# Patient Record
Sex: MALE | Race: OTHER | Employment: FULL TIME | URBAN - METROPOLITAN AREA
[De-identification: names, ages, dates, MRNs, and addresses within clinical notes are randomized per-mention and may not be internally consistent; named-entity substitution may affect disease eponyms.]

---

## 2021-02-10 ENCOUNTER — OFFICE VISIT (OUTPATIENT)
Dept: FAMILY MEDICINE CLINIC | Age: 26
End: 2021-02-10

## 2021-02-10 VITALS
BODY MASS INDEX: 27.55 KG/M2 | WEIGHT: 196.8 LBS | SYSTOLIC BLOOD PRESSURE: 124 MMHG | TEMPERATURE: 97.2 F | HEIGHT: 71 IN | HEART RATE: 80 BPM | DIASTOLIC BLOOD PRESSURE: 80 MMHG | OXYGEN SATURATION: 96 %

## 2021-02-10 DIAGNOSIS — R35.0 URINATION FREQUENCY: Primary | ICD-10-CM

## 2021-02-10 PROCEDURE — 99202 OFFICE O/P NEW SF 15 MIN: CPT | Performed by: INTERNAL MEDICINE

## 2021-02-10 RX ORDER — SULFAMETHOXAZOLE AND TRIMETHOPRIM 800; 160 MG/1; MG/1
1 TABLET ORAL 2 TIMES DAILY
Qty: 20 TABLET | Refills: 0 | Status: SHIPPED | OUTPATIENT
Start: 2021-02-10 | End: 2021-02-20

## 2021-02-10 RX ORDER — SULFAMETHOXAZOLE AND TRIMETHOPRIM 800; 160 MG/1; MG/1
1 TABLET ORAL 2 TIMES DAILY
Qty: 20 TABLET | Refills: 0 | Status: SHIPPED | OUTPATIENT
Start: 2021-02-10 | End: 2021-02-10 | Stop reason: SDUPTHER

## 2021-02-10 SDOH — ECONOMIC STABILITY: INCOME INSECURITY: HOW HARD IS IT FOR YOU TO PAY FOR THE VERY BASICS LIKE FOOD, HOUSING, MEDICAL CARE, AND HEATING?: HARD

## 2021-02-10 SDOH — ECONOMIC STABILITY: FOOD INSECURITY: WITHIN THE PAST 12 MONTHS, THE FOOD YOU BOUGHT JUST DIDN'T LAST AND YOU DIDN'T HAVE MONEY TO GET MORE.: SOMETIMES TRUE

## 2021-02-10 ASSESSMENT — PATIENT HEALTH QUESTIONNAIRE - PHQ9
SUM OF ALL RESPONSES TO PHQ QUESTIONS 1-9: 0
SUM OF ALL RESPONSES TO PHQ QUESTIONS 1-9: 0

## 2021-02-10 ASSESSMENT — VISUAL ACUITY: OU: 1

## 2021-02-10 ASSESSMENT — ENCOUNTER SYMPTOMS
CONSTIPATION: 0
SHORTNESS OF BREATH: 0
CHEST TIGHTNESS: 0
VOMITING: 0
BLOOD IN STOOL: 0
COUGH: 0
DIARRHEA: 0
ABDOMINAL PAIN: 0
WHEEZING: 0
ANAL BLEEDING: 0
NAUSEA: 0
CHOKING: 0

## 2021-02-10 NOTE — PROGRESS NOTES
Positive for frequency and urgency. Negative for flank pain, hematuria and hesitancy. Musculoskeletal: Negative for joint swelling and myalgias. Skin: Negative. Neurological: Negative for dizziness. Psychiatric/Behavioral: Negative for sleep disturbance. All other systems reviewed and are negative. Objective:       Physical Exam:  /80 (Site: Left Upper Arm, Position: Sitting, Cuff Size: Large Adult)   Pulse 80   Temp 97.2 °F (36.2 °C) (Temporal)   Ht 5' 11\" (1.803 m)   Wt 196 lb 12.8 oz (89.3 kg)   SpO2 96%   BMI 27.45 kg/m²   Physical Exam  Vitals signs and nursing note reviewed. Constitutional:       General: He is not in acute distress. Appearance: He is well-developed. He is not ill-appearing. Eyes:      General: Lids are normal. Vision grossly intact. Cardiovascular:      Rate and Rhythm: Normal rate and regular rhythm. Heart sounds: Normal heart sounds, S1 normal and S2 normal. No murmur. No friction rub. No gallop. Pulmonary:      Effort: Pulmonary effort is normal. No respiratory distress. Breath sounds: Normal breath sounds. No wheezing. Abdominal:      General: Bowel sounds are normal.      Palpations: Abdomen is soft. There is no mass. Tenderness: There is abdominal tenderness in the suprapubic area. There is no right CVA tenderness, left CVA tenderness or guarding. Hernia: No hernia is present. Musculoskeletal: Normal range of motion. Skin:     General: Skin is warm and dry. Capillary Refill: Capillary refill takes less than 2 seconds. Neurological:      General: No focal deficit present. Mental Status: He is alert and oriented to person, place, and time. Data Review         Assessment/Plan:      1. Urination frequency  - sulfamethoxazole-trimethoprim (BACTRIM DS;SEPTRA DS) 800-160 MG per tablet; Take 1 tablet by mouth 2 times daily for 10 days  Dispense: 20 tablet;  Refill: 0           Health Maintenance Due Topic Date Due    Hepatitis C screen  1996    Varicella vaccine (1 of 2 - 2-dose childhood series) 07/28/1997    HPV vaccine (1 - Male 2-dose series) 07/28/2007    HIV screen  07/28/2011    DTaP/Tdap/Td vaccine (1 - Tdap) 07/28/2015    Flu vaccine (1) 09/01/2020       Electronically signed by Elvia Bruno MD on 2/10/2021 at 9:29 AM

## 2021-02-10 NOTE — PROGRESS NOTES
Pt is here to establish  He went to use the bathroom he states feels like his bladder is empty but then a few minutes later he felt the urge to go to void. He states does have some discomfort on RT side upper groin area.  He states more so when laying down, he also finds that is when he has to void

## 2022-04-20 ENCOUNTER — HOSPITAL ENCOUNTER (OUTPATIENT)
Dept: GENERAL RADIOLOGY | Facility: CLINIC | Age: 27
Discharge: HOME | End: 2022-04-22

## 2022-04-20 ENCOUNTER — HOSPITAL ENCOUNTER (OUTPATIENT)
Facility: CLINIC | Age: 27
Discharge: HOME | End: 2022-04-22

## 2022-04-20 DIAGNOSIS — S93.502A SPRAIN OF LEFT GREAT TOE, INITIAL ENCOUNTER: ICD-10-CM

## 2022-04-20 DIAGNOSIS — S90.112A CONTUSION OF LEFT GREAT TOE WITHOUT DAMAGE TO NAIL, INITIAL ENCOUNTER: ICD-10-CM

## 2022-04-20 PROCEDURE — 73630 X-RAY EXAM OF FOOT: CPT

## 2022-04-22 ENCOUNTER — OFFICE VISIT (OUTPATIENT)
Dept: PODIATRY | Age: 27
End: 2022-04-22

## 2022-04-22 VITALS
SYSTOLIC BLOOD PRESSURE: 148 MMHG | DIASTOLIC BLOOD PRESSURE: 82 MMHG | HEIGHT: 71 IN | WEIGHT: 160 LBS | BODY MASS INDEX: 22.4 KG/M2 | HEART RATE: 82 BPM

## 2022-04-22 DIAGNOSIS — L60.0 INGROWING NAIL, LEFT GREAT TOE: Primary | ICD-10-CM

## 2022-04-22 DIAGNOSIS — M79.675 PAIN OF LEFT GREAT TOE: ICD-10-CM

## 2022-04-22 PROCEDURE — 99203 OFFICE O/P NEW LOW 30 MIN: CPT

## 2022-04-22 NOTE — PROGRESS NOTES
[address] [facility] [address] [place] [address], [facility]  Tel: [phone]  Fax: [phone]    Subjective     CC: ingrown nail to the left big toe    HPI:  Yonny Kathleen is a 23y.o. year old male who presents to clinic today for possible ingrown nail to the left big for quite some time. Patient states that he has been having pain to the medial aspect of the left. 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 Patient also states that he recently performed a nail trimming distal aspect of the nail was removed furthermore back. Patient denies any recent trauma to the area. Patient denies any nausea, vomiting, fever, chills or shortness of breath. Primary care physician is No primary care provider on file. .    ROS:    Constitutional: Denies nausea, vomiting, fever, chills. Neurologic: Denies numbness, tingling, and burning in the feet. Vascular: Denies symptoms of lower extremity claudication. Skin: Denies open wounds. Otherwise negative except as noted in the HPI. PMH:  No past medical history on file. Surgical History:   No past surgical history on file. Social History:  Social History     Tobacco Use    Smoking status: Never Smoker    Smokeless tobacco: Never Used   Vaping Use    Vaping Use: Some days    Substances: Nicotine, Flavoring    Devices: Disposable   Substance Use Topics    Alcohol use: Yes     Comment: occassionally    Drug use: Yes     Types: Marijuana (Weed)     Comment: every day       Medications:  Prior to Admission medications    Not on File       Objective     Vitals:    04/22/22 1504   BP: (!) 148/82   Pulse: 82       No results found for: LABA1C    Physical Exam:  General:  Alert and oriented x3. In no acute distress.      Lower Extremity Physical Exam:    Vascular: DP and PT pulses are intact and palpable, Bilateral. CFT <3 seconds to all digits, Bilateral.  No edema, Bilateral.  Hair growth is present to the level of the digits, Bilateral.     Neuro: Saph/sural/SP/DP/plantar sensation intact to light touch. Protective sensation is intact to 10/10 sites as tested with a 5.07g SWMF, Bilateral.     Musculoskeletal: EHL/FHL/GS/TA gross motor intact. Tenderness to palpation of medial left big toe. Gross deformity is absent, Bilateral. Flexor and extensor strength maintained to digital level. No pain noted to palpation of the cuboid or the adjacent metatarsals or lisfranc joint complex. Ankle joint ROM is within normal limits bilateral, STJ, and MTJ ROM full without pain or crepitus bilateral.      Dermatologic: No open lesions, Bilateral.  Mild erythema appreciated to the medial aspect of the left great toe. No drainage or malodor appreciated. Interdigital maceration absent, Bilateral.  Nails 1-10 are well trimmed. Biomechanical Exam:    Right foot: 1st MTPJ: Loaded:41 Unloaded:65  Right foot: 1st Ray: 5 mm in dorsal and plantar direction from neutral      Right foot: Ankle DF knee extended: 10  Right foot: Ankle DF knee flexed: 16    Left foot: 1st MTPJ: Loaded: 42 Unloaded: 64  Left foot: 1st Ray: 5 mm in dorsal and plantar direction from neutral  Left foot: Ankle DF knee extended: 10  Left foot: Ankle DF knee flexed: 15    Gait Analysis: normal    Imaging: none    Assessment   Jovanny Pedersen is a 23 y.o. male with     Diagnosis Orders   1. Ingrowing nail, left great toe     2. Pain of left great toe          Plan   · Patient examined and evaluated  · Diagnosis and treatment options discussed in detail  · Patient instructed to monitor for signs and symptoms of infection, including but not limited to increased erythema, increased edema, increased pain, purulent drainage, f/n/v/c/SOB/chest pain, and to call the clinic for an urgent appointment or to report to the ED should they experience these symptoms.  The patient verbalized understanding  · The patient was educated on clinical and findings, diagnosis and treatment plans. Patient states that he understands all that has been explained and all questions were answered to his apparent satisfaction. · The patient presented to our clinic today for possible ingrown nail. t is difficult to assess the nature of the ingrown nail as the patient had trimmed the nail further back. Discussed with pt to let the nail grow to determine the nature of the nail. · Pt also advised to wear boots that have room in the toes. He states he has new shoes from [facility] and can be exchanged. · There is low risk of morbidity from additional diagnostic testing or treatment. At this time we recommend moving forward with changing his shoe gear and letting the nail grow. If the increase in shoe size does not help and he notices the ingrown nail then we can perform a PNA of the medial border of the left great toe nail. Pt is amenable to plan  · Patient to RTC in 1 month   · Discussed with Dr. Madeleine Madison  · Please, call the office with any questions or concerns     No orders of the defined types were placed in this encounter. No orders of the defined types were placed in this encounter.       Charli Jean DPM   Podiatric Medicine & Surgery   4/24/2022 at 3:04 PM